# Patient Record
Sex: MALE
[De-identification: names, ages, dates, MRNs, and addresses within clinical notes are randomized per-mention and may not be internally consistent; named-entity substitution may affect disease eponyms.]

---

## 2018-11-10 ENCOUNTER — HOSPITAL ENCOUNTER (EMERGENCY)
Dept: HOSPITAL 97 - ER | Age: 68
Discharge: HOME | End: 2018-11-10
Payer: COMMERCIAL
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
## 2018-11-10 DIAGNOSIS — S52.531A: Primary | ICD-10-CM

## 2018-11-10 PROCEDURE — 99283 EMERGENCY DEPT VISIT LOW MDM: CPT

## 2018-11-10 PROCEDURE — 2W3CX1Z IMMOBILIZATION OF RIGHT LOWER ARM USING SPLINT: ICD-10-PCS

## 2018-11-10 NOTE — EDPHYS
Physician Documentation                                                                           

 [facility]                                                                

Name: Jarvis Ortiz                                                                                

Age: 68 yrs                                                                                       

Sex: Male                                                                                         

: 1950                                                                                   

MRN: [id number]                                                                                   

Arrival Date: 11/10/2018                                                                          

Time: 14:58                                                                                       

Account#: [id number]                                                                            

Bed 12                                                                                            

Private MD:                                                                                       

ED Physician Dar Garcia                                                                       

HPI:                                                                                              

11/10                                                                                             

17:36 This 68 yrs old  Male presents to ER via Ambulatory with complaints of Fall     gs  

      Injury, Wrist Pain.                                                                         

17:36 Details of fall: The patient fell from an upright position, while walking. Onset: The   gs  

      symptoms/episode began/occurred acutely, just prior to arrival. Associated injuries:        

      The patient sustained dorsal aspect of right wrist, deformity, painful injury,              

      swelling. Severity of symptoms: At their worst the symptoms were moderate, in the           

      emergency department the symptoms are unchanged. The patient has not experienced            

      similar symptoms in the past.                                                               

                                                                                                  

Historical:                                                                                       

- Allergies:                                                                                      

15:13 No Known Allergies;                                                                     aa5 

- PMHx:                                                                                           

15:13 Hypertension;                                                                           aa5 

- PSHx:                                                                                           

15:13 right hand;                                                                             aa5 

                                                                                                  

- Immunization history:: Last tetanus immunization: unknown.                                      

- Social history:: Smoking status: Patient/guardian denies using tobacco.                         

- Ebola Screening: : No symptoms or risks identified at this time.                                

                                                                                                  

                                                                                                  

ROS:                                                                                              

17:36 All other systems are negative.                                                         gs  

                                                                                                  

Exam:                                                                                             

17:36 Head/Face:  Normocephalic, atraumatic. Eyes:  Pupils equal round and reactive to light, gs  

      extra-ocular motions intact.  Lids and lashes normal.  Conjunctiva and sclera are           

      non-icteric and not injected.  Cornea within normal limits.  Periorbital areas with no      

      swelling, redness, or edema. ENT:  Nares patent. No nasal discharge, no septal              

      abnormalities noted.  Tympanic membranes are normal and external auditory canals are        

      clear.  Oropharynx with no redness, swelling, or masses, exudates, or evidence of           

      obstruction, uvula midline.  Mucous membranes moist. Neck:  Trachea midline, no             

      thyromegaly or masses palpated, and no cervical lymphadenopathy.  Supple, full range of     

      motion without nuchal rigidity, or vertebral point tenderness.  No Meningismus.             

      Chest/axilla:  Normal chest wall appearance and motion.  Nontender with no deformity.       

      No lesions are appreciated. Cardiovascular:  Regular rate and rhythm with a normal S1       

      and S2.  No gallops, murmurs, or rubs.  Normal PMI, no JVD.  No pulse deficits.             

      Respiratory:  Lungs have equal breath sounds bilaterally, clear to auscultation and         

      percussion.  No rales, rhonchi or wheezes noted.  No increased work of breathing, no        

      retractions or nasal flaring. Abdomen/GI:  Soft, non-tender, with normal bowel sounds.      

      No distension or tympany.  No guarding or rebound.  No evidence of tenderness               

      throughout. Back:  No spinal tenderness.  No costovertebral tenderness.  Full range of      

      motion. Skin:  Warm, dry with normal turgor.  Normal color with no rashes, no lesions,      

      and no evidence of cellulitis. Neuro:  Awake and alert, GCS 15, oriented to person,         

      place, time, and situation.  Cranial nerves II-XII grossly intact.  Motor strength 5/5      

      in all extremities.  Sensory grossly intact.  Cerebellar exam normal.  Normal gait.         

17:36 Constitutional: The patient appears alert, awake, uncomfortable.                            

17:36 Musculoskeletal/extremity: Extremities: noted in the dorsal aspect of right wrist:          

      deformity, ROM: limited active range of motion, limited passive range of motion,            

      limited active range of motion due to pain, limited passive range of motion due to          

      pain, Pulses: are normal with no appreciated deficits.                                      

                                                                                                  

Vital Signs:                                                                                      

15:13  / 86; Pulse 72; Resp 18 S; Temp 98.2(TE); Pulse Ox 100% on R/A; Weight 75.75 kg  aa5 

      (R); Height 5 ft. 8 in. (172.72 cm) (R); Pain 0/10;                                         

15:13 Body Mass Index 25.39 (75.75 kg, 172.72 cm)                                             aa5 

15:13 Pt only reports pain to right wrist with movement                                       aa5 

                                                                                                  

Procedures:                                                                                       

17:36 Splinting: Splint applied to dorsal aspect of right wrist using Orthoglass splint,      gs  

      applied by tech. Examined by me, post splint application: neurovascular intact, 2+          

      distal pulses palpable, brisk capillary refill noted, Patient tolerated well.               

                                                                                                  

MDM:                                                                                              

16:52 Patient medically screened.                                                             gs  

17:36 Differential diagnosis: contusion, fracture, sprain, strain. Data reviewed: vital       gs  

      signs, nurses notes. Response to treatment: the patient's symptoms have markedly            

      improved after treatment, and as a result, I will discharge patient.                        

                                                                                                  

11/10                                                                                             

16:22 Order name: Wrist Right 3 View XRAY                                                     iw  

11/10                                                                                             

16:59 Order name: Splint - Volar Wrist Splint; Complete Time: 17:26                             

                                                                                                  

Administered Medications:                                                                         

No medications were administered                                                                  

                                                                                                  

                                                                                                  

Disposition:                                                                                      

11/10/18 17:43 Discharged to Home. Impression: Colles' fracture of right radius.                  

- Condition is Stable.                                                                            

- Discharge Instructions: Wrist Splint, Easy-to-Read, Wrist Pain, Easy-to-Read.                   

- Prescriptions for Naprosyn 500 mg Oral Tablet - take 1 tablet by ORAL route 2 times             

  per day As needed take with food; 20 tablet. Tylenol- Codeine #4 300-60 mg Oral                 

  Tablet - take 1 tablet by ORAL route every 6 hours As needed; 10 tablet.                        

- Medication Reconciliation Form, Thank You Letter, Antibiotic Education, Prescription            

  Opioid Use form.                                                                                

- Follow up: Anderson Beach MD; When: 2 - 3 days; Reason: Re-evaluation by your               

  physician.                                                                                      

                                                                                                  

                                                                                                  

                                                                                                  

Signatures:                                                                                       

Dispatcher [employer]                           EDMS                                                 

Anderson Villarreal RN RN sg Calderon, Audri, RN RN   aa5                                                  

Dar Garcia MD MD                                                      

                                                                                                  

Corrections: (The following items were deleted from the chart)                                    

17:47 17:43 11/10/2018 17:43 Discharged to Home. Impression: Colles' fracture of right        sg  

      radius. Condition is Stable. Forms are Medication Reconciliation Form, Thank You            

      Letter, Antibiotic Education, Prescription Opioid Use. Follow up: Anderson Beach;         

      When: 2 - 3 days; Reason: Re-evaluation by your physician. gs                               

                                                                                                  

**************************************************************************************************

## 2018-11-10 NOTE — ER
Nurse's Notes                                                                                     

 [facility]                                                                

Name: Jarvis Ortiz                                                                                

Age: 68 yrs                                                                                       

Sex: Male                                                                                         

: 1950                                                                                   

MRN: [id number]                                                                                   

Arrival Date: 11/10/2018                                                                          

Time: 14:58                                                                                       

Account#: [id number]                                                                            

Bed 12                                                                                            

Private MD:                                                                                       

Diagnosis: Colles' fracture of right radius                                                       

                                                                                                  

Presentation:                                                                                     

11/10                                                                                             

15:11 Presenting complaint: Patient states: "I slipped and fell and hurt my wrist". Pt c/o    aa5 

      pain to right wrist. Pt denies head injury. Care prior to arrival: None. Mechanism of       

      Injury: Fall from standing position. Trauma event details: Injury occurred in the           

      [facility], Injury occurred: at home. Injury occurred: November 10, 2018.           

15:11 Acuity: FLOWER 4                                                                           aa5 

15:11 Method Of Arrival: Ambulatory                                                           aa5 

15:15 Transition of care: patient was received from another setting of care (hospital).       iw  

      Transition of care: patient was not received from another setting of care. Onset of         

      symptoms was November 10, 2018. Risk Assessment: Do you want to hurt yourself or            

      someone else? Patient reports no desire to harm self or others. Initial Sepsis Screen:      

      Does the patient meet any 2 criteria? No. Patient's initial sepsis screen is negative.      

      Does the patient have a suspected source of infection? No. Patient's initial sepsis         

      screen is negative.                                                                         

                                                                                                  

Trauma Activation: Not Applicable                                                                 

 Physician: ED Physician; Name: ; Notified At: ; Arrived At:                                      

 Physician: General Surgeon; Name: ; Notified At: ; Arrived At:                                   

 Physician: Radiology; Name: ; Notified At: ; Arrived At:                                         

 Physician: Respiratory; Name: ; Notified At: ; Arrived At:                                       

 Physician: Lab; Name: ; Notified At: ; Arrived At:                                               

                                                                                                  

Historical:                                                                                       

- Allergies:                                                                                      

15:13 No Known Allergies;                                                                     aa5 

- PMHx:                                                                                           

15:13 Hypertension;                                                                           aa5 

- PSHx:                                                                                           

15:13 right hand;                                                                             aa5 

                                                                                                  

- Immunization history:: Last tetanus immunization: unknown.                                      

- Social history:: Smoking status: Patient/guardian denies using tobacco.                         

- Ebola Screening: : No symptoms or risks identified at this time.                                

                                                                                                  

                                                                                                  

Screenin:00 Abuse screen: Denies threats or abuse. Denies injuries from another. Nutritional        iw  

      screening: No deficits noted. Tuberculosis screening: Fall Risk None identified.            

                                                                                                  

Assessment:                                                                                       

16:00 General: Appears in no apparent distress. Behavior is calm, cooperative. Pain:          iw  

      Complains of pain in right wrist. Neuro: Level of Consciousness is awake, alert, obeys      

      commands, Oriented to person, place, time, situation, Moves all extremities. Full           

      function. Cardiovascular: Patient's skin is warm and dry. Respiratory: Respiratory          

      effort is even, unlabored. Derm: Skin is intact, is healthy with good turgor.               

      Musculoskeletal: Range of motion: limited in right wrist Swelling present in right          

      wrist.                                                                                      

17:10 Reassessment: Patient appears in no apparent distress at this time. Patient and/or      iw  

      family updated on plan of care and expected duration. Pain level reassessed. Patient is     

      alert, oriented x 3, equal unlabored respirations, skin warm/dry/pink.                      

                                                                                                  

Vital Signs:                                                                                      

15:13  / 86; Pulse 72; Resp 18 S; Temp 98.2(TE); Pulse Ox 100% on R/A; Weight 75.75 kg  aa5 

      (R); Height 5 ft. 8 in. (172.72 cm) (R); Pain 0/10;                                         

15:13 Body Mass Index 25.39 (75.75 kg, 172.72 cm)                                             aa5 

15:13 Pt only reports pain to right wrist with movement                                       aa5 

                                                                                                  

ED Course:                                                                                        

14:58 Patient arrived in ED.                                                                  mr  

15:12 Triage completed.                                                                       aa5 

15:12 Arm band placed on.                                                                     aa5 

15:55 Janay Rayo, RN is Primary Nurse.                                                   iw  

15:55 Dar Garcia MD is Attending Physician.                                              gs  

16:16 Patient has correct armband on for positive identification.                             iw  

16:16 Patient did not have IV access during this emergency room visit.                        iw  

17:23 Orthoglass splint: Volar splint applied on right arm.                                   jb1 

17:25 X-ray completed. Portable x-ray completed in exam room. Patient tolerated procedure     ka  

      well.                                                                                       

17:25 Orthoglass splint: Volar splint applied on right arm Radial pulse present and within    jb1 

      normal limits before and after application of splint. Capillary refill was three            

      seconds before and after application of splint.                                             

17:26 Wrist Right 3 View XRAY In Process Unspecified.                                         EDMS

17:42 Anderson Beach MD is Referral Physician.                                            gs  

17:45 No provider procedures requiring assistance completed.                                  iw  

                                                                                                  

Administered Medications:                                                                         

No medications were administered                                                                  

                                                                                                  

                                                                                                  

Outcome:                                                                                          

17:43 Discharge ordered by MD.                                                                gs  

17:45 Discharged to home ambulatory, with family.                                             sg  

17:45 Condition: good                                                                             

17:45 Discharge instructions given to patient, family, Instructed on discharge instructions,      

      follow up and referral plans. medication usage, safety practices, Demonstrated              

      understanding of instructions, follow-up care, medications, Prescriptions given X 2.        

17:47 Patient left the ED.                                                                    sg  

                                                                                                  

Signatures:                                                                                       

Dispatcher [employer]                           EDMS                                                 

Fernando Vaughn                                 jb1                                                  

Anderson Villarreal RN RN sg Rivera Korin snyder                                                   

Janay Rayo RN RN iw Calderon, Audri, RN RN   aa5                                                  

Rach Albert Gregory, MD MD gs                                                   

                                                                                                  

Corrections: (The following items were deleted from the chart)                                    

16:16 16:00 Musculoskeletal: Range of motion: intact in all extremities, iw                   iw  

                                                                                                  

**************************************************************************************************

## 2018-11-10 NOTE — RAD REPORT
EXAM DESCRIPTION:  RAD - Wrist Right 3 View - 11/10/2018 5:26 pm

 

CLINICAL HISTORY:  Right wrist pain status post injury

 

FINDINGS:  Avulsion fracture involves the ulnar styloid process

 

Mildly displaced impacted fracture involves the distal radius. No dislocation seen